# Patient Record
Sex: FEMALE | Race: WHITE | Employment: FULL TIME | URBAN - NONMETROPOLITAN AREA
[De-identification: names, ages, dates, MRNs, and addresses within clinical notes are randomized per-mention and may not be internally consistent; named-entity substitution may affect disease eponyms.]

---

## 2018-08-17 ENCOUNTER — HOSPITAL ENCOUNTER (OUTPATIENT)
Dept: WOMENS IMAGING | Age: 48
Discharge: HOME | End: 2018-08-17
Payer: COMMERCIAL

## 2018-08-17 DIAGNOSIS — Z12.39 BREAST SCREENING: ICD-10-CM

## 2018-08-17 PROCEDURE — 77063 BREAST TOMOSYNTHESIS BI: CPT

## 2021-01-19 ENCOUNTER — HOSPITAL ENCOUNTER (OUTPATIENT)
Dept: WOMENS IMAGING | Age: 51
Discharge: HOME | End: 2021-01-19
Payer: COMMERCIAL

## 2021-01-19 DIAGNOSIS — Z12.39 BREAST SCREENING: ICD-10-CM

## 2021-01-19 PROCEDURE — 77063 BREAST TOMOSYNTHESIS BI: CPT

## 2021-06-08 ENCOUNTER — HOSPITAL ENCOUNTER (OUTPATIENT)
Age: 51
Discharge: HOME | End: 2021-06-08
Payer: COMMERCIAL

## 2021-06-08 LAB
BASOPHILS # BLD: 1.4 %
BASOPHILS ABSOLUTE: 0.1 THOU/MM3 (ref 0–0.1)
EOSINOPHIL # BLD: 5.6 %
EOSINOPHILS ABSOLUTE: 0.4 THOU/MM3 (ref 0–0.4)
ERYTHROCYTE [DISTWIDTH] IN BLOOD BY AUTOMATED COUNT: 13.9 % (ref 11.5–14.5)
ERYTHROCYTE [DISTWIDTH] IN BLOOD BY AUTOMATED COUNT: 46.2 FL (ref 35–45)
HCT VFR BLD CALC: 37.5 % (ref 37–47)
HEMOGLOBIN: 12 GM/DL (ref 12–16)
IMMATURE GRANS (ABS): 0.03 THOU/MM3 (ref 0–0.07)
IMMATURE GRANULOCYTES: 0.4 %
INFLUENZA A: NOT DETECTED
INFLUENZA B: NOT DETECTED
LYMPHOCYTES # BLD: 29.9 %
LYMPHOCYTES ABSOLUTE: 2.3 THOU/MM3 (ref 1–4.8)
MCH RBC QN AUTO: 29.2 PG (ref 26–33)
MCHC RBC AUTO-ENTMCNC: 32 GM/DL (ref 32.2–35.5)
MCV RBC AUTO: 91.2 FL (ref 81–99)
MONOCYTES # BLD: 10 %
MONOCYTES ABSOLUTE: 0.8 THOU/MM3 (ref 0.4–1.3)
NUCLEATED RED BLOOD CELLS: 0 /100 WBC
PLATELET # BLD: 347 THOU/MM3 (ref 130–400)
PMV BLD AUTO: 9.7 FL (ref 9.4–12.4)
RBC # BLD: 4.11 MILL/MM3 (ref 4.2–5.4)
SARS-COV-2 RNA, RT PCR: NOT DETECTED
SEG NEUTROPHILS: 52.7 %
SEGMENTED NEUTROPHILS ABSOLUTE COUNT: 4.1 THOU/MM3 (ref 1.8–7.7)
WBC # BLD: 7.7 THOU/MM3 (ref 4.8–10.8)

## 2021-06-08 PROCEDURE — 36415 COLL VENOUS BLD VENIPUNCTURE: CPT

## 2021-06-08 PROCEDURE — 87636 SARSCOV2 & INF A&B AMP PRB: CPT

## 2021-06-08 PROCEDURE — 85025 COMPLETE CBC W/AUTO DIFF WBC: CPT

## 2021-06-09 RX ORDER — M-VIT,TX,IRON,MINS/CALC/FOLIC 27MG-0.4MG
1 TABLET ORAL DAILY
COMMUNITY

## 2021-06-09 NOTE — PROGRESS NOTES
NPO after midnight except sip of water with heart/BP meds  Follow all instructions given by surgeon including medications to hold  Bring insurance card and photo ID  Shower the night before or morning of procedure with liquid antibacterial soap  Wear comfortable clothing  Do not bring jewelry or valuables  Bring list of medications with dosage and how often taken if not reviewed   needed at discharge at least 27years old  Call PAT at [phone] for questions    Instructed to call surgery center at [phone] upon arrival to speak with  before entering building. Covid screen due  at [facility] hospital 6 to 7 days before procedure.  Pt had completed on 6-8 at [facility] and is negative

## 2021-06-15 ENCOUNTER — HOSPITAL ENCOUNTER (OUTPATIENT)
Age: 51
Setting detail: OUTPATIENT SURGERY
Discharge: HOME | End: 2021-06-15
Attending: OBSTETRICS & GYNECOLOGY | Admitting: OBSTETRICS & GYNECOLOGY
Payer: COMMERCIAL

## 2021-06-15 ENCOUNTER — ANESTHESIA EVENT (OUTPATIENT)
Dept: OPERATING ROOM | Age: 51
End: 2021-06-15
Payer: COMMERCIAL

## 2021-06-15 ENCOUNTER — ANESTHESIA (OUTPATIENT)
Dept: OPERATING ROOM | Age: 51
End: 2021-06-15
Payer: COMMERCIAL

## 2021-06-15 VITALS
OXYGEN SATURATION: 97 % | HEART RATE: 50 BPM | TEMPERATURE: 96.8 F | DIASTOLIC BLOOD PRESSURE: 69 MMHG | BODY MASS INDEX: 31.62 KG/M2 | WEIGHT: 185.2 LBS | HEIGHT: 64 IN | SYSTOLIC BLOOD PRESSURE: 101 MMHG | RESPIRATION RATE: 9 BRPM

## 2021-06-15 VITALS
TEMPERATURE: 97 F | OXYGEN SATURATION: 97 % | SYSTOLIC BLOOD PRESSURE: 89 MMHG | DIASTOLIC BLOOD PRESSURE: 55 MMHG | RESPIRATION RATE: 19 BRPM

## 2021-06-15 PROBLEM — N92.1 MENOMETRORRHAGIA: Status: ACTIVE | Noted: 2021-06-15

## 2021-06-15 PROCEDURE — 3600000003 HC SURGERY LEVEL 3 BASE: Performed by: OBSTETRICS & GYNECOLOGY

## 2021-06-15 PROCEDURE — 7100000000 HC PACU RECOVERY - FIRST 15 MIN: Performed by: OBSTETRICS & GYNECOLOGY

## 2021-06-15 PROCEDURE — 2720000010 HC SURG SUPPLY STERILE: Performed by: OBSTETRICS & GYNECOLOGY

## 2021-06-15 PROCEDURE — 3700000000 HC ANESTHESIA ATTENDED CARE: Performed by: OBSTETRICS & GYNECOLOGY

## 2021-06-15 PROCEDURE — 7100000011 HC PHASE II RECOVERY - ADDTL 15 MIN: Performed by: OBSTETRICS & GYNECOLOGY

## 2021-06-15 PROCEDURE — 6360000002 HC RX W HCPCS

## 2021-06-15 PROCEDURE — 6360000002 HC RX W HCPCS: Performed by: NURSE ANESTHETIST, CERTIFIED REGISTERED

## 2021-06-15 PROCEDURE — 2709999900 HC NON-CHARGEABLE SUPPLY: Performed by: OBSTETRICS & GYNECOLOGY

## 2021-06-15 PROCEDURE — 88305 TISSUE EXAM BY PATHOLOGIST: CPT

## 2021-06-15 PROCEDURE — 2500000003 HC RX 250 WO HCPCS: Performed by: NURSE ANESTHETIST, CERTIFIED REGISTERED

## 2021-06-15 PROCEDURE — C1886 CATHETER, ABLATION: HCPCS | Performed by: OBSTETRICS & GYNECOLOGY

## 2021-06-15 PROCEDURE — 3600000013 HC SURGERY LEVEL 3 ADDTL 15MIN: Performed by: OBSTETRICS & GYNECOLOGY

## 2021-06-15 PROCEDURE — 2580000003 HC RX 258: Performed by: OBSTETRICS & GYNECOLOGY

## 2021-06-15 PROCEDURE — 3700000001 HC ADD 15 MINUTES (ANESTHESIA): Performed by: OBSTETRICS & GYNECOLOGY

## 2021-06-15 PROCEDURE — 7100000001 HC PACU RECOVERY - ADDTL 15 MIN: Performed by: OBSTETRICS & GYNECOLOGY

## 2021-06-15 PROCEDURE — 7100000010 HC PHASE II RECOVERY - FIRST 15 MIN: Performed by: OBSTETRICS & GYNECOLOGY

## 2021-06-15 RX ORDER — KETOROLAC TROMETHAMINE 30 MG/ML
INJECTION, SOLUTION INTRAMUSCULAR; INTRAVENOUS
Status: COMPLETED
Start: 2021-06-15 | End: 2021-06-15

## 2021-06-15 RX ORDER — PROMETHAZINE HYDROCHLORIDE 25 MG/ML
12.5 INJECTION, SOLUTION INTRAMUSCULAR; INTRAVENOUS
Status: DISCONTINUED | OUTPATIENT
Start: 2021-06-15 | End: 2021-06-15 | Stop reason: HOSPADM

## 2021-06-15 RX ORDER — SODIUM CHLORIDE 0.9 % (FLUSH) 0.9 %
5-40 SYRINGE (ML) INJECTION PRN
Status: DISCONTINUED | OUTPATIENT
Start: 2021-06-15 | End: 2021-06-15 | Stop reason: HOSPADM

## 2021-06-15 RX ORDER — FENTANYL CITRATE 50 UG/ML
INJECTION, SOLUTION INTRAMUSCULAR; INTRAVENOUS PRN
Status: DISCONTINUED | OUTPATIENT
Start: 2021-06-15 | End: 2021-06-15 | Stop reason: SDUPTHER

## 2021-06-15 RX ORDER — ONDANSETRON 2 MG/ML
INJECTION INTRAMUSCULAR; INTRAVENOUS PRN
Status: DISCONTINUED | OUTPATIENT
Start: 2021-06-15 | End: 2021-06-15 | Stop reason: SDUPTHER

## 2021-06-15 RX ORDER — KETOROLAC TROMETHAMINE 10 MG/1
10 TABLET, FILM COATED ORAL EVERY 6 HOURS PRN
Qty: 12 TABLET | Refills: 0 | Status: SHIPPED | OUTPATIENT
Start: 2021-06-15 | End: 2022-06-15

## 2021-06-15 RX ORDER — ONDANSETRON 2 MG/ML
4 INJECTION INTRAMUSCULAR; INTRAVENOUS EVERY 6 HOURS PRN
Status: DISCONTINUED | OUTPATIENT
Start: 2021-06-15 | End: 2021-06-15 | Stop reason: HOSPADM

## 2021-06-15 RX ORDER — KETOROLAC TROMETHAMINE 30 MG/ML
30 INJECTION, SOLUTION INTRAMUSCULAR; INTRAVENOUS ONCE
Status: COMPLETED | OUTPATIENT
Start: 2021-06-15 | End: 2021-06-15

## 2021-06-15 RX ORDER — LIDOCAINE HYDROCHLORIDE 20 MG/ML
INJECTION, SOLUTION EPIDURAL; INFILTRATION; INTRACAUDAL; PERINEURAL PRN
Status: DISCONTINUED | OUTPATIENT
Start: 2021-06-15 | End: 2021-06-15 | Stop reason: SDUPTHER

## 2021-06-15 RX ORDER — LABETALOL 20 MG/4 ML (5 MG/ML) INTRAVENOUS SYRINGE
10 EVERY 10 MIN PRN
Status: DISCONTINUED | OUTPATIENT
Start: 2021-06-15 | End: 2021-06-15 | Stop reason: HOSPADM

## 2021-06-15 RX ORDER — MIDAZOLAM HYDROCHLORIDE 1 MG/ML
INJECTION INTRAMUSCULAR; INTRAVENOUS PRN
Status: DISCONTINUED | OUTPATIENT
Start: 2021-06-15 | End: 2021-06-15 | Stop reason: SDUPTHER

## 2021-06-15 RX ORDER — FENTANYL CITRATE 50 UG/ML
25 INJECTION, SOLUTION INTRAMUSCULAR; INTRAVENOUS EVERY 5 MIN PRN
Status: DISCONTINUED | OUTPATIENT
Start: 2021-06-15 | End: 2021-06-15 | Stop reason: HOSPADM

## 2021-06-15 RX ORDER — SODIUM CHLORIDE 9 MG/ML
INJECTION, SOLUTION INTRAVENOUS CONTINUOUS
Status: DISCONTINUED | OUTPATIENT
Start: 2021-06-15 | End: 2021-06-15 | Stop reason: HOSPADM

## 2021-06-15 RX ORDER — DEXAMETHASONE SODIUM PHOSPHATE 10 MG/ML
INJECTION, EMULSION INTRAMUSCULAR; INTRAVENOUS PRN
Status: DISCONTINUED | OUTPATIENT
Start: 2021-06-15 | End: 2021-06-15 | Stop reason: SDUPTHER

## 2021-06-15 RX ORDER — PROPOFOL 10 MG/ML
INJECTION, EMULSION INTRAVENOUS PRN
Status: DISCONTINUED | OUTPATIENT
Start: 2021-06-15 | End: 2021-06-15 | Stop reason: SDUPTHER

## 2021-06-15 RX ORDER — SODIUM CHLORIDE 9 MG/ML
25 INJECTION, SOLUTION INTRAVENOUS PRN
Status: DISCONTINUED | OUTPATIENT
Start: 2021-06-15 | End: 2021-06-15 | Stop reason: HOSPADM

## 2021-06-15 RX ORDER — SODIUM CHLORIDE 0.9 % (FLUSH) 0.9 %
5-40 SYRINGE (ML) INJECTION EVERY 12 HOURS SCHEDULED
Status: DISCONTINUED | OUTPATIENT
Start: 2021-06-15 | End: 2021-06-15 | Stop reason: HOSPADM

## 2021-06-15 RX ORDER — FENTANYL CITRATE 50 UG/ML
50 INJECTION, SOLUTION INTRAMUSCULAR; INTRAVENOUS EVERY 5 MIN PRN
Status: DISCONTINUED | OUTPATIENT
Start: 2021-06-15 | End: 2021-06-15 | Stop reason: HOSPADM

## 2021-06-15 RX ADMIN — KETOROLAC TROMETHAMINE 30 MG: 30 INJECTION, SOLUTION INTRAMUSCULAR; INTRAVENOUS at 11:47

## 2021-06-15 RX ADMIN — PROPOFOL 140 MG: 10 INJECTION, EMULSION INTRAVENOUS at 12:04

## 2021-06-15 RX ADMIN — MIDAZOLAM 2 MG: 1 INJECTION INTRAMUSCULAR; INTRAVENOUS at 12:02

## 2021-06-15 RX ADMIN — SODIUM CHLORIDE: 9 INJECTION, SOLUTION INTRAVENOUS at 12:02

## 2021-06-15 RX ADMIN — ONDANSETRON HYDROCHLORIDE 4 MG: 4 INJECTION, SOLUTION INTRAMUSCULAR; INTRAVENOUS at 12:08

## 2021-06-15 RX ADMIN — FENTANYL CITRATE 50 MCG: 50 INJECTION, SOLUTION INTRAMUSCULAR; INTRAVENOUS at 12:08

## 2021-06-15 RX ADMIN — DEXAMETHASONE SODIUM PHOSPHATE 10 MG: 10 INJECTION, EMULSION INTRAMUSCULAR; INTRAVENOUS at 12:08

## 2021-06-15 RX ADMIN — LIDOCAINE HYDROCHLORIDE 80 MG: 20 INJECTION, SOLUTION EPIDURAL; INFILTRATION; INTRACAUDAL; PERINEURAL at 12:04

## 2021-06-15 ASSESSMENT — PULMONARY FUNCTION TESTS
PIF_VALUE: 0
PIF_VALUE: 3
PIF_VALUE: 3
PIF_VALUE: 24
PIF_VALUE: 4
PIF_VALUE: 3
PIF_VALUE: 3
PIF_VALUE: 1
PIF_VALUE: 3
PIF_VALUE: 23
PIF_VALUE: 3
PIF_VALUE: 4
PIF_VALUE: 4
PIF_VALUE: 3
PIF_VALUE: 26
PIF_VALUE: 3
PIF_VALUE: 4
PIF_VALUE: 3

## 2021-06-15 ASSESSMENT — PAIN - FUNCTIONAL ASSESSMENT: PAIN_FUNCTIONAL_ASSESSMENT: 0-10

## 2021-06-15 ASSESSMENT — PAIN SCALES - WONG BAKER: WONGBAKER_NUMERICALRESPONSE: 0

## 2021-06-15 ASSESSMENT — PAIN SCALES - GENERAL: PAINLEVEL_OUTOF10: 0

## 2021-06-15 NOTE — ANESTHESIA PRE PROCEDURE
Department of Anesthesiology  Preprocedure Note       Name:  Prerna Sharpe   Age:  52 y.o.  :  1970                                          MRN:  [id number]         Date:  6/15/2021      Surgeon: Gali Crawford): Srini Coughlin MD    Procedure: Procedure(s):  DILATATION AND CURETTAGE HYSTEROSCOPY ENDOMETRIAL ABLATION    Medications prior to admission:   Prior to Admission medications    Medication Sig Start Date End Date Taking? Authorizing Provider   Multiple Vitamins-Minerals (THERAPEUTIC MULTIVITAMIN-MINERALS) tablet Take 1 tablet by mouth daily   Yes Historical Provider, MD   APPLE CIDER VINEGAR PO Take by mouth every morning (before breakfast) gummies   Yes Historical Provider, MD   ibuprofen (ADVIL;MOTRIN) 200 MG tablet Take 200 mg by mouth every 6 hours as needed for Pain. Historical Provider, MD   phenyleph-promethazine-codeine (PHENERGAN VC W/CODEINE) 5-6.25-10 MG/5ML SYRP syrup Take 5 mLs by mouth every 6 hours as needed. 14   Rj Estrada MD   azithromycin (ZITHROMAX Z-MIKEL) 250 MG tablet TAKE 2 TABS FIRST DAY AND ONE TAB DAILY FOR NEXT FOUR DAYS 14   Rowena Velez MD       Current medications:    Current Facility-Administered Medications   Medication Dose Route Frequency Provider Last Rate Last Admin    0.9 % sodium chloride infusion   Intravenous Continuous Srini Coughlin MD        sodium chloride flush 0.9 % injection 5-40 mL  5-40 mL Intravenous 2 times per day Srini Coughlin MD        sodium chloride flush 0.9 % injection 5-40 mL  5-40 mL Intravenous PRN Srini Coughlin MD        0.9 % sodium chloride infusion  25 mL Intravenous PRN Srini Coughlin MD        ondansetron [facility]) injection 4 mg  4 mg Intravenous Q6H PRN Srini Coughlin MD           Allergies:  No Known Allergies    Problem List:  There is no problem list on file for this patient.       Past Medical History:        Diagnosis Date    Asthma     exercise induced   no problem for years Past Surgical History:        Procedure Laterality Date    COLONOSCOPY      TUBAL LIGATION      TYMPANOSTOMY TUBE PLACEMENT      VEIN SURGERY         Social History:    Social History     Tobacco Use    Smoking status: Passive Smoke Exposure - Never Smoker    Smokeless tobacco: Never Used   Substance Use Topics    Alcohol use: Yes     Comment: socially                                 Counseling given: Not Answered      Vital Signs (Current):   Vitals:    06/09/21 0822   Weight: 175 lb (79.4 kg)   Height: 5' 4\" (1.626 m)                                              BP Readings from Last 3 Encounters:   No data found for BP       NPO Status:                                                                                 BMI:   Wt Readings from Last 3 Encounters:   06/09/21 175 lb (79.4 kg)     Body mass index is 30.04 kg/m². CBC:   Lab Results   Component Value Date    WBC 7.7 06/08/2021    RBC 4.11 06/08/2021    HGB 12.0 06/08/2021    HCT 37.5 06/08/2021    MCV 91.2 06/08/2021     06/08/2021       CMP: No results found for: NA, K, CL, CO2, BUN, CREATININE, GFRAA, AGRATIO, LABGLOM, GLUCOSE, PROT, CALCIUM, BILITOT, ALKPHOS, AST, ALT    POC Tests: No results for input(s): POCGLU, POCNA, POCK, POCCL, POCBUN, POCHEMO, POCHCT in the last 72 hours.     Coags: No results found for: PROTIME, INR, APTT    HCG (If Applicable): No results found for: PREGTESTUR, PREGSERUM, HCG, HCGQUANT     ABGs: No results found for: PHART, PO2ART, DLX4LLQ, KNO9ECD, BEART, T3TEMFKK     Type & Screen (If Applicable):  No results found for: LABABO, LABRH    Drug/Infectious Status (If Applicable):  No results found for: HIV, HEPCAB    COVID-19 Screening (If Applicable):   Lab Results   Component Value Date    COVID19 Not Detected 06/08/2021           Anesthesia Evaluation  Patient summary reviewed  Airway: Mallampati: III  TM distance: >3 FB   Neck ROM: full  Mouth opening: > = 3 FB Dental:          Pulmonary:   (+) asthma: Cardiovascular:                      Neuro/Psych:               GI/Hepatic/Renal:             Endo/Other:                     Abdominal:           Vascular:                                        Anesthesia Plan      general     ASA 2       Induction: intravenous. MIPS: Postoperative opioids intended and Prophylactic antiemetics administered. Anesthetic plan and risks discussed with patient. Plan discussed with CRNA. Doc Garcia.  [address] [place] [address]   6/15/2021

## 2021-06-15 NOTE — H&P
[address] [place] [address]                       PREOPERATIVE HISTORY AND PHYSICAL    PATIENT NAME: Karen Delaney                    :        1970  MED REC NO:   [id number]                           ROOM:  ACCOUNT NO:   [de-identified]                           ADMIT DATE: 06/15/2021  PROVIDER:     Lotus Toledo M.D.    DATE OF SURGERY:  06/15/2021    HISTORY:  The patient is a 61-year-old who presents with significant  menometrorrhagia and small subserosal fibroids for a diagnostic  hysteroscopy, D and C and Dolly endometrial ablation. Risks, benefits  and risk of failure have all been discussed as well as the risk of  future pregnancy. She is status post tubal ligation and wishes to  proceed. PAST MEDICAL HISTORY:  Major illnesses, none. PRIOR SURGERIES:  Colonoscopy, tubal ligation. ALLERGIES:  None known. FAMILY HISTORY:  Positive for heart disease, hypertension,  endometriosis, type 1 diabetes, hypothyroidism hypercholesterolemia and  lung cancer. SOCIAL HISTORY:  She is . She is a nonsmoker. Denies use of  illicit drugs or excessive use of alcohol. REVIEW OF SYSTEMS:  Positive for obesity with a BMI of 33. PHYSICAL EXAMINATION  GENERAL:  A well-developed, well-nourished obese female, in no acute  distress. HEENT:  Normal.  LUNGS:  Clear. CARDIOVASCULAR:  Regular rate and rhythm without significant murmurs,  thrills, heaves or rubs. ABDOMEN:  Obese, soft without hepatosplenomegaly. PELVIC:  Exam is negative for masses. EXTREMITIES:  Normal.  NEUROLOGIC:  Exam is physiologic. IMPRESSION:  Menometrorrhagia, small subserosal fibroid. PLAN:  Hysteroscopy, D and C, Dolly endometrial ablation. David Acuna M.D.    D: 2021 11:48:33       T: 2021 85:15:16     [id number]  Job#: [id number]     Doc#: [id number]    CC:   Lotus Toledo M.D.

## 2021-06-15 NOTE — ANESTHESIA POSTPROCEDURE EVALUATION
Department of Anesthesiology  Postprocedure Note    Patient: Gabe Baldwin  MRN: [id number]  YOB: 1970  Date of evaluation: 6/15/2021  Time:  1:50 PM     Procedure Summary     Date: 06/15/21 Room / Location: [facility] 04 / [facility]    Anesthesia Start: 1200 Anesthesia Stop: 2351    Procedure: DILATATION AND CURETTAGE HYSTEROSCOPY ENDOMETRIAL ABLATION (N/A Uterus) Diagnosis: (MENOMETRORRHAGIA)    Surgeons: Ashli Norman MD Responsible Provider: Karina Duenas DO    Anesthesia Type: general ASA Status: 2          Anesthesia Type: general    Humberto Phase I: Humberto Score: 9    Humberto Phase II: Humberto Score: 9    Last vitals: Reviewed and per EMR flowsheets.        Anesthesia Post Evaluation    Patient location during evaluation: PACU  Patient participation: complete - patient participated  Level of consciousness: awake  Airway patency: patent  Nausea & Vomiting: no vomiting and no nausea  Complications: no  Cardiovascular status: hemodynamically stable  Respiratory status: acceptable  Hydration status: stable

## 2021-06-15 NOTE — PROGRESS NOTES
1230: Patient to phase 1 recovery room via cart. Patient is not arousing to name at this time, oral airway is in place. Patient placed on monitor and vitals obtained, see charting. Report received from surgical RN, Simon Peck and Bryan Gleason CRNA. Patient in on 6 liters via simple mask. 1232: IV is infusing 0.9 into her right AC. Ann pad is in place and is clean, dry and intact. 1237: Patient is continuing not to arouse to name at this time, oral airway remains in place. 1239: Patient is arousing to name at this time- oral airway removed. Patient is denying pain and nausea. 1245: Patient is resting in bed quietly, respirations are even and unlabored. Vital signs remain stable on room air.   1252: Patient is continuing to deny pain and nausea at this time. Vital signs remain stable on room air.   1301: Patient meets criteria to be discharged from phase 1 to phase 2. Patient moved to phase 2 at this time. 1303: Requested ice chips given. Bed is in the lowest position, call light is within reach and patient's  brought to the room. 1314: Patient stating she feels like she is bleeding a lot. Pad checked at this time- very scant amount of pink/redish drainage on pad. Offered drink given. Bed is in the lowest position and call light is within reach. 1330: Patient is resting in bed and continuing to deny pain and nausea at this time.  remains at the bedside. IV fluids have all infused. Patient instructed to let staff know when she is ready to go home- she verbalized understanding. 1337: Discharge instructions given and explained to the patient and the patient's , both verbalized understanding. 1339: Patient stating she is ready to go home. IV removed at this time- no complications and dressing applied. 1348: Patient wheeled to the car and discharged home in stable condition with her .

## 2021-06-16 NOTE — OP NOTE
transcervically to the  fundus. The array was opened. The cervical occlusion balloon was  insufflated and once cavity integrity was assured, a therapeutic cycle  for two minutes was carried out without difficulty. The balloon was  de-insufflated. The array was closed, device removed, minimal bleeding  present. The patient was again placed supine, awakened, and taken to  recovery in good condition. Preet Galvez M.D.    D: 06/15/2021 12:38:13       T: 06/15/2021 12:43:04     [id number]  Job#: [id number]     Doc#: [id number]    CC:   Sreedhar Calderón M.D.

## 2022-01-27 ENCOUNTER — HOSPITAL ENCOUNTER (OUTPATIENT)
Age: 52
Setting detail: SPECIMEN
Discharge: HOME | End: 2022-01-27

## 2022-01-27 LAB
ALBUMIN SERPL-MCNC: 4 G/DL (ref 3.5–5.2)
ALBUMIN/GLOBULIN RATIO: 1.4 (ref 1–2.5)
ALP BLD-CCNC: 80 U/L (ref 35–104)
ALT SERPL-CCNC: 14 U/L (ref 5–33)
ANION GAP SERPL CALCULATED.3IONS-SCNC: 12 MMOL/L (ref 9–17)
AST SERPL-CCNC: 12 U/L
BILIRUB SERPL-MCNC: 0.49 MG/DL (ref 0.3–1.2)
BUN BLDV-MCNC: 7 MG/DL (ref 6–20)
BUN/CREAT BLD: NORMAL (ref 9–20)
CALCIUM SERPL-MCNC: 9.4 MG/DL (ref 8.6–10.4)
CHLORIDE BLD-SCNC: 101 MMOL/L (ref 98–107)
CHOLESTEROL/HDL RATIO: 6
CHOLESTEROL: 227 MG/DL
CO2: 25 MMOL/L (ref 20–31)
CREAT SERPL-MCNC: 0.58 MG/DL (ref 0.5–0.9)
GFR AFRICAN AMERICAN: >60 ML/MIN
GFR NON-AFRICAN AMERICAN: >60 ML/MIN
GFR SERPL CREATININE-BSD FRML MDRD: NORMAL ML/MIN/{1.73_M2}
GFR SERPL CREATININE-BSD FRML MDRD: NORMAL ML/MIN/{1.73_M2}
GLUCOSE BLD-MCNC: 90 MG/DL (ref 70–99)
HCT VFR BLD CALC: 39 % (ref 36.3–47.1)
HDLC SERPL-MCNC: 38 MG/DL
HEMOGLOBIN: 12.3 G/DL (ref 11.9–15.1)
LDL CHOLESTEROL: 160 MG/DL (ref 0–130)
MCH RBC QN AUTO: 28.4 PG (ref 25.2–33.5)
MCHC RBC AUTO-ENTMCNC: 31.5 G/DL (ref 28.4–34.8)
MCV RBC AUTO: 90.1 FL (ref 82.6–102.9)
NRBC AUTOMATED: 0 PER 100 WBC
PDW BLD-RTO: 13.4 % (ref 11.8–14.4)
PLATELET # BLD: 374 K/UL (ref 138–453)
PMV BLD AUTO: 10.2 FL (ref 8.1–13.5)
POTASSIUM SERPL-SCNC: 4.3 MMOL/L (ref 3.7–5.3)
RBC # BLD: 4.33 M/UL (ref 3.95–5.11)
SODIUM BLD-SCNC: 138 MMOL/L (ref 135–144)
TOTAL PROTEIN: 6.8 G/DL (ref 6.4–8.3)
TRIGL SERPL-MCNC: 143 MG/DL
TSH SERPL DL<=0.05 MIU/L-ACNC: 1.53 MIU/L (ref 0.3–5)
VLDLC SERPL CALC-MCNC: ABNORMAL MG/DL (ref 1–30)
WBC # BLD: 7 K/UL (ref 3.5–11.3)

## 2022-01-28 LAB
ESTIMATED AVERAGE GLUCOSE: 128 MG/DL
HBA1C MFR BLD: 6.1 % (ref 4–6)

## (undated) DEVICE — HANDPIECE ABLAT DISP FOR ENDOMET SYS

## (undated) DEVICE — SURE SET SINGLE BASIN-LF: Brand: MEDLINE INDUSTRIES, INC.

## (undated) DEVICE — PAD,SANITARY,11 IN,MAXI,W/WINGS,N-STRL: Brand: MEDLINE

## (undated) DEVICE — Z DISCONTINUED BY MEDLINE USE 2711682 TRAY SKIN PREP DRY W/ PREM GLV

## (undated) DEVICE — PACK,SET UP,NO DRAPES: Brand: MEDLINE

## (undated) DEVICE — PAD,NON-ADHERENT,3X8,STERILE,LF,1/PK: Brand: MEDLINE

## (undated) DEVICE — GLOVE ORANGE PI 8   MSG9080

## (undated) DEVICE — GAUZE,SPONGE,8"X4",12PLY,XRAY,STRL,LF: Brand: MEDLINE

## (undated) DEVICE — SOLUTION SURG PREP POV IOD 7.5% 4 OZ

## (undated) DEVICE — PREP SOL PVP IODINE 4%  4 OZ/BTL

## (undated) DEVICE — TOWEL,OR,DSP,ST,BLUE,DLX,4/PK,20PK/CS: Brand: MEDLINE

## (undated) DEVICE — Z DISCONTINUED NO SUB IDED KIT ENDOMET ABLAT IMPED CTRL DEV W/ RF CTRL FTSWCH SUCT LN